# Patient Record
Sex: MALE | Race: ASIAN | NOT HISPANIC OR LATINO | URBAN - METROPOLITAN AREA
[De-identification: names, ages, dates, MRNs, and addresses within clinical notes are randomized per-mention and may not be internally consistent; named-entity substitution may affect disease eponyms.]

---

## 2018-05-05 ENCOUNTER — EMERGENCY (EMERGENCY)
Facility: HOSPITAL | Age: 78
LOS: 1 days | Discharge: HOME | End: 2018-05-05
Admitting: EMERGENCY MEDICINE
Payer: MEDICARE

## 2018-05-05 VITALS
HEART RATE: 80 BPM | DIASTOLIC BLOOD PRESSURE: 76 MMHG | OXYGEN SATURATION: 100 % | RESPIRATION RATE: 17 BRPM | SYSTOLIC BLOOD PRESSURE: 135 MMHG | TEMPERATURE: 98 F

## 2018-05-05 PROCEDURE — 99283 EMERGENCY DEPT VISIT LOW MDM: CPT

## 2018-05-05 RX ORDER — LIDOCAINE 4 G/100G
1 CREAM TOPICAL ONCE
Qty: 0 | Refills: 0 | Status: COMPLETED | OUTPATIENT
Start: 2018-05-05 | End: 2018-05-05

## 2018-05-05 RX ORDER — LORATADINE 10 MG/1
10 TABLET ORAL ONCE
Qty: 0 | Refills: 0 | Status: COMPLETED | OUTPATIENT
Start: 2018-05-05 | End: 2018-05-05

## 2018-05-05 RX ORDER — IBUPROFEN 200 MG
1 TABLET ORAL
Qty: 28 | Refills: 0 | OUTPATIENT
Start: 2018-05-05 | End: 2018-05-11

## 2018-05-05 RX ORDER — IBUPROFEN 200 MG
600 TABLET ORAL ONCE
Qty: 0 | Refills: 0 | Status: COMPLETED | OUTPATIENT
Start: 2018-05-05 | End: 2018-05-05

## 2018-05-05 RX ORDER — LORATADINE 10 MG/1
1 TABLET ORAL
Qty: 7 | Refills: 0 | OUTPATIENT
Start: 2018-05-05 | End: 2018-05-11

## 2018-05-05 RX ADMIN — Medication 600 MILLIGRAM(S): at 15:51

## 2018-05-05 RX ADMIN — Medication 1 TABLET(S): at 15:51

## 2018-05-05 RX ADMIN — LIDOCAINE 1 PATCH: 4 CREAM TOPICAL at 15:51

## 2018-05-05 NOTE — ED PROVIDER NOTE - CARE PLAN
Principal Discharge DX:	Ear pain  Assessment and plan of treatment:	Take Augmentin 875/125mg twice daily for 7 days. Take Claritin 10mg once daily. Rest affected area. Take motrin 600mg every 6h as needed for pain. Please continue all home medications as directed. See your regular doctor within 72 hours for follow-up care. Return to ER for new or worsening symptoms.

## 2018-05-05 NOTE — ED PROVIDER NOTE - OBJECTIVE STATEMENT
77 year old male with no pertinent PMHx pw worsening left ear pain for 1 week. Pain is intermittent and radiates down the neck with subjective fever. Denies change in hearing, discharge from ear, foreign travel, sick contacts, headache, n/v, sore throat, cough, CP, SOB, abdominal pain, dysuria, hematuria.

## 2018-05-05 NOTE — ED PROVIDER NOTE - PLAN OF CARE
Take Augmentin 875/125mg twice daily for 7 days. Take Claritin 10mg once daily. Rest affected area. Take motrin 600mg every 6h as needed for pain. Please continue all home medications as directed. See your regular doctor within 72 hours for follow-up care. Return to ER for new or worsening symptoms.

## 2018-05-05 NOTE — ED PROVIDER NOTE - MEDICAL DECISION MAKING DETAILS
77 year old male with no pertinent PMHx pw worsening left ear pain for 1 week.  Plan: abx, pain meds